# Patient Record
Sex: FEMALE | NOT HISPANIC OR LATINO | Employment: OTHER | URBAN - METROPOLITAN AREA
[De-identification: names, ages, dates, MRNs, and addresses within clinical notes are randomized per-mention and may not be internally consistent; named-entity substitution may affect disease eponyms.]

---

## 2021-01-18 ENCOUNTER — APPOINTMENT (OUTPATIENT)
Dept: PREADMISSION TESTING | Facility: HOSPITAL | Age: 86
End: 2021-01-18

## 2021-02-01 ENCOUNTER — APPOINTMENT (OUTPATIENT)
Dept: PREADMISSION TESTING | Facility: HOSPITAL | Age: 86
End: 2021-02-01

## 2023-08-24 ENCOUNTER — OFFICE VISIT (OUTPATIENT)
Dept: CARDIOLOGY | Facility: CLINIC | Age: 88
End: 2023-08-24
Payer: MEDICARE

## 2023-08-24 VITALS
HEART RATE: 69 BPM | OXYGEN SATURATION: 93 % | DIASTOLIC BLOOD PRESSURE: 60 MMHG | SYSTOLIC BLOOD PRESSURE: 140 MMHG | HEIGHT: 63 IN | WEIGHT: 147 LBS | BODY MASS INDEX: 26.05 KG/M2

## 2023-08-24 DIAGNOSIS — R06.02 SHORTNESS OF BREATH: ICD-10-CM

## 2023-08-24 DIAGNOSIS — I10 HYPERTENSION, UNSPECIFIED TYPE: ICD-10-CM

## 2023-08-24 DIAGNOSIS — R07.9 CHEST PAIN, UNSPECIFIED TYPE: Primary | ICD-10-CM

## 2023-08-24 PROCEDURE — 1160F RVW MEDS BY RX/DR IN RCRD: CPT | Performed by: NURSE PRACTITIONER

## 2023-08-24 PROCEDURE — 99204 OFFICE O/P NEW MOD 45 MIN: CPT | Performed by: NURSE PRACTITIONER

## 2023-08-24 PROCEDURE — 1159F MED LIST DOCD IN RCRD: CPT | Performed by: NURSE PRACTITIONER

## 2023-08-24 RX ORDER — MULTIVIT-MIN/IRON/FOLIC ACID/K 18-600-40
CAPSULE ORAL
COMMUNITY

## 2023-08-24 RX ORDER — GABAPENTIN 300 MG/1
1 CAPSULE ORAL EVERY 6 HOURS
COMMUNITY
Start: 2023-08-08

## 2023-08-24 RX ORDER — LOSARTAN POTASSIUM 50 MG/1
1 TABLET ORAL EVERY 12 HOURS SCHEDULED
COMMUNITY
Start: 2023-06-29

## 2023-08-24 RX ORDER — OXYBUTYNIN CHLORIDE 5 MG/1
TABLET ORAL
COMMUNITY
Start: 2023-06-10

## 2023-08-24 RX ORDER — FUROSEMIDE 20 MG/1
1 TABLET ORAL DAILY
COMMUNITY
Start: 2023-08-17

## 2023-08-24 RX ORDER — FAMOTIDINE 20 MG/1
20 TABLET, FILM COATED ORAL 2 TIMES DAILY
COMMUNITY

## 2023-08-24 RX ORDER — ASPIRIN 81 MG/1
81 TABLET ORAL DAILY
COMMUNITY

## 2023-08-24 RX ORDER — AMLODIPINE BESYLATE 5 MG/1
1 TABLET ORAL DAILY
COMMUNITY
Start: 2023-07-25

## 2023-08-24 RX ORDER — CARVEDILOL 6.25 MG/1
1 TABLET ORAL EVERY 12 HOURS SCHEDULED
COMMUNITY
Start: 2023-07-25

## 2023-08-24 NOTE — PROGRESS NOTES
[facility] Provider Note     Date:   2023   Name: Kera Max  :   1927  PCP: Justin Gardner MD    Chief Complaint   Patient presents with    [facility]    Hospital Follow Up Visit    Chest Pain       Subjective     History of Present Illness  Kera Max is a 89 y.o. female with PMH of hypertension and GERD who presents today for new patient evaluation for recent ER visit for chest pain.  Patient presented to [facility] ER on 2023 with complaints of intermittent chest pain.  She had an episode of sudden onset upper abdominal/chest pain and shortness of breath which made her feel like she was going to pass out.  Work-up in the ER was unremarkable, MI was ruled out with 2 stable EKGs and 2 high-sensitivity troponins.  Her BNP was normal and chest x-ray was negative for congestive heart failure.  She was treated for mild dehydration and given 500 mL of fluid and remained asymptomatic.  She was discharged home to follow-up with PCP and cardiology.  They instructed her to hold off on diuretics and use KYLE hose instead for lower extremity edema.  She feels more short of breath than usual but she has not had chest pain since the ER visit.  She denies palpitations, dizziness, lower extremity edema or syncope.  She lives in independent living at [facility] and uses a rolling walker to assist with ambulation.    No specialty comments available.     Reports Denies   Chest Pain [] [x]   Shortness of Air [x] []   Palpitations [] [x]   Edema [] [x]   Dizziness [] [x]   Syncope [] [x]       No Known Allergies    Current Outpatient Medications:     amLODIPine (NORVASC) 5 MG tablet, Take 1 tablet by mouth Daily., Disp: , Rfl:     carvedilol (COREG) 6.25 MG tablet, Take 1 tablet by mouth Every 12 (Twelve) Hours., Disp: , Rfl:     furosemide (LASIX) 20 MG tablet, Take 1 tablet by mouth Daily., Disp: , Rfl:     gabapentin (NEURONTIN) 300 MG capsule, Take 1  "capsule by mouth Every 6 (Six) Hours., Disp: , Rfl:     losartan (COZAAR) 50 MG tablet, Take 1 tablet by mouth Every 12 (Twelve) Hours., Disp: , Rfl:     oxybutynin (DITROPAN) 5 MG tablet, TAKE 1/2 (ONE-HALF) TABLET BY MOUTH ONCE DAILY AS NEEDED, Disp: , Rfl:     aspirin 81 MG EC tablet, Take 1 tablet by mouth Daily., Disp: , Rfl:     Cholecalciferol (Vitamin D) 50 MCG ( UT) capsule, Take  by mouth., Disp: , Rfl:     famotidine (PEPCID) 20 MG tablet, Take 1 tablet by mouth 2 (Two) Times a Day., Disp: , Rfl:     Mirabegron ER (Myrbetriq) 25 MG tablet sustained-release 24 hour 24 hr tablet, Take 1 tablet by mouth Daily., Disp: , Rfl:     Past Medical History:   Diagnosis Date    GERD (gastroesophageal reflux disease)     Hypertension       Past Surgical History:   Procedure Laterality Date    APPENDECTOMY       SECTION      CHOLECYSTECTOMY      COLONOSCOPY      ENDOSCOPY      FEMUR SURGERY      HERNIA REPAIR      KNEE ARTHROPLASTY      KNEE SURGERY      TONSILLECTOMY       Family History   Problem Relation Age of Onset    No Known Problems Mother     No Known Problems Father      Social History     Socioeconomic History    Marital status:    Tobacco Use    Smoking status: Never     Passive exposure: Never    Smokeless tobacco: Never   Vaping Use    Vaping Use: Never used   Substance and Sexual Activity    Alcohol use: Never    Drug use: Never    Sexual activity: Defer       Objective     Vital Signs:  /60   Pulse 69   Ht 160 cm (63\")   Wt 66.7 kg (147 lb)   SpO2 93%   BMI 26.04 kg/mý   Estimated body mass index is 26.04 kg/mý as calculated from the following:    Height as of this encounter: 160 cm (63\").    Weight as of this encounter: 66.7 kg (147 lb).             Physical Exam  Constitutional:       Appearance: Normal appearance. She is well-developed.   HENT:      Head: Normocephalic and atraumatic.   Eyes:      Pupils: Pupils are equal, round, and reactive to light.   Neck:      " Vascular: No carotid bruit.   Cardiovascular:      Rate and Rhythm: Normal rate and regular rhythm.      Pulses: Normal pulses.           Dorsalis pedis pulses are 2+ on the right side and 2+ on the left side.        Posterior tibial pulses are 2+ on the right side and 2+ on the left side.      Heart sounds: Normal heart sounds. No murmur heard.  Pulmonary:      Breath sounds: Normal breath sounds. No wheezing or rhonchi.   Musculoskeletal:      Right lower leg: No edema.      Left lower leg: No edema.   Skin:     Capillary Refill: Capillary refill takes less than 2 seconds.      Coloration: Skin is not cyanotic.      Nails: There is no clubbing.   Neurological:      Mental Status: She is alert and oriented to person, place, and time.      Motor: No weakness.      Gait: Gait normal.   Psychiatric:         Mood and Affect: Mood normal.         Behavior: Behavior is cooperative.         Thought Content: Thought content normal.         Cognition and Memory: Memory normal.         Recent hospitalization notes reviewed: ER notes and labs from 8/20/2023 reviewed          Assessment and Plan     Diagnoses and all orders for this visit:    1. Chest pain, unspecified type (Primary)  Assessment & Plan:  ER visit on 8/20/2023 for acute episode of chest pain.  MI ruled out, with 2 stable EKGs and 2 high sensitive troponins, BNP was normal.  She was treated for dehydration and instructed to follow-up with cardiology.  She has not had any chest pain since the ER visit.  We discussed further cardiac testing including an echocardiogram and stress test.  Patient declines a stress test at this time but is okay with an echocardiogram.      2. Hypertension, unspecified type  Assessment & Plan:  Hypertension is  stable .  Continue current treatment regimen.  Dietary sodium restriction.  Blood pressure will be reassessed at the next regular appointment.      3. Shortness of breath  Assessment & Plan:  - Echocardiogram for further  evaluation and management.      Orders:  -     Adult Transthoracic Echo Complete W/ Cont if Necessary Per Protocol; Future        Recommendations: ER if symptoms increase and Report if any new/changing symptoms immediately          Follow Up  Return in about 4 weeks (around 9/21/2023) for echo results. .  Patient was given instructions and counseling regarding her condition or for health maintenance advice. Please see specific information pulled into the AVS if appropriate.

## 2023-08-24 NOTE — ASSESSMENT & PLAN NOTE
ER visit on 8/20/2023 for acute episode of chest pain.  MI ruled out, with 2 stable EKGs and 2 high sensitive troponins, BNP was normal.  She was treated for dehydration and instructed to follow-up with cardiology.  She has not had any chest pain since the ER visit.  We discussed further cardiac testing including an echocardiogram and stress test.  Patient declines a stress test at this time but is okay with an echocardiogram.

## 2023-08-24 NOTE — ASSESSMENT & PLAN NOTE
Hypertension is  stable .  Continue current treatment regimen.  Dietary sodium restriction.  Blood pressure will be reassessed at the next regular appointment.

## 2023-08-28 PROCEDURE — 93306 TTE W/DOPPLER COMPLETE: CPT | Performed by: INTERNAL MEDICINE

## 2023-09-06 ENCOUNTER — OUTSIDE FACILITY SERVICE (OUTPATIENT)
Dept: CARDIOLOGY | Facility: CLINIC | Age: 88
End: 2023-09-06
Payer: MEDICARE

## 2023-09-25 ENCOUNTER — OFFICE VISIT (OUTPATIENT)
Dept: CARDIOLOGY | Facility: CLINIC | Age: 88
End: 2023-09-25

## 2023-09-25 VITALS
SYSTOLIC BLOOD PRESSURE: 138 MMHG | WEIGHT: 149 LBS | DIASTOLIC BLOOD PRESSURE: 90 MMHG | OXYGEN SATURATION: 97 % | BODY MASS INDEX: 26.4 KG/M2 | HEIGHT: 63 IN | HEART RATE: 81 BPM

## 2023-09-25 DIAGNOSIS — I51.89 DIASTOLIC DYSFUNCTION: ICD-10-CM

## 2023-09-25 DIAGNOSIS — I10 HYPERTENSION, UNSPECIFIED TYPE: Primary | ICD-10-CM

## 2023-09-25 DIAGNOSIS — I05.0 MITRAL VALVE STENOSIS, UNSPECIFIED ETIOLOGY: ICD-10-CM

## 2023-09-25 DIAGNOSIS — R07.9 CHEST PAIN, UNSPECIFIED TYPE: ICD-10-CM

## 2023-09-25 PROBLEM — R41.3 AMNESIA: Status: ACTIVE | Noted: 2023-09-25

## 2023-09-25 PROCEDURE — 99214 OFFICE O/P EST MOD 30 MIN: CPT | Performed by: NURSE PRACTITIONER

## 2023-09-25 PROCEDURE — 1159F MED LIST DOCD IN RCRD: CPT | Performed by: NURSE PRACTITIONER

## 2023-09-25 PROCEDURE — 1160F RVW MEDS BY RX/DR IN RCRD: CPT | Performed by: NURSE PRACTITIONER

## 2023-09-25 NOTE — ASSESSMENT & PLAN NOTE
Mild mitral stenosis noted on echo from 8/28/2023.  - We will continue to monitor with annual echocardiogram

## 2023-09-25 NOTE — ASSESSMENT & PLAN NOTE
ER visit on 8/20/2023 for acute episode of chest pain. MI ruled out, with 2 stable EKGs and 2 high sensitive troponins, BNP was normal. She was treated for dehydration and instructed to follow-up with cardiology. She has not had any chest pain since the ER visit.  She occasionally feels a sensation in her chest that she cannot describe, but does not describe it as a pain or palpitations.  We discussed further cardiac testing (stress test) at last office visit but she declines a stress test at this time.

## 2023-09-25 NOTE — PROGRESS NOTES
[facility] Provider Note     Date:   2023   Name: Kera Max  :   1927  PCP: Justin Gardner MD    Chief Complaint   Patient presents with    Chest Pain     Echo results       Subjective     History of Present Illness  Kera Max is a 89 y.o. female with past medical history of hypertension and GERD who presents today for follow-up on recent echocardiogram.  Patient initially presented on 2023 for an ER follow-up visit.  She was evaluated at [facility] ER on 2023 with complaints of intermittent chest pain. She had an episode of sudden onset upper abdominal/chest pain and shortness of breath which made her feel like she was going to pass out. Work-up in the ER was unremarkable, MI was ruled out with 2 stable EKGs and 2 high-sensitivity troponins. Her BNP was normal and chest x-ray was negative for congestive heart failure. She was treated for mild dehydration and given 500 mL of fluid and remained asymptomatic.  An echocardiogram was ordered and completed on 2023 that revealed normal LV size and function, mild mitral stenosis, mild to moderate mitral regurgitation and grade 1 diastolic dysfunction without elevated left atrial pressure.  She has not had any further episodes of chest pain or worsening shortness of breath.  She has an occasional sensation in her chest that she is unable to describe.  She does not feel like they are palpitations.  She denies dizziness or syncope.  She lives in independent living at [facility] and uses a rolling walker to assist with ambulation.    Cardiac history  1.  Diastolic dysfunction (grade 1).  2.  Hypertension    Echocardiogram 2023-normal LV size and function.  Mild mitral stenosis.  Mild to moderate mitral valve regurgitation.  Mildly elevated estimated RVSP.  Grade 1 diastolic dysfunction without elevated left atrial pressure.      No Known Allergies    Current Outpatient Medications:  "    amLODIPine (NORVASC) 5 MG tablet, Take 1 tablet by mouth Daily., Disp: , Rfl:     aspirin 81 MG EC tablet, Take 1 tablet by mouth Daily., Disp: , Rfl:     carvedilol (COREG) 6.25 MG tablet, Take 1 tablet by mouth Every 12 (Twelve) Hours., Disp: , Rfl:     Cholecalciferol (Vitamin D) 50 MCG (2000 UT) capsule, Take  by mouth., Disp: , Rfl:     famotidine (PEPCID) 20 MG tablet, Take 1 tablet by mouth 2 (Two) Times a Day., Disp: , Rfl:     furosemide (LASIX) 20 MG tablet, Take 1 tablet by mouth Daily., Disp: , Rfl:     gabapentin (NEURONTIN) 300 MG capsule, Take 1 capsule by mouth Every 6 (Six) Hours., Disp: , Rfl:     losartan (COZAAR) 50 MG tablet, Take 1 tablet by mouth Every 12 (Twelve) Hours., Disp: , Rfl:     Mirabegron ER (MYRBETRIQ) 25 MG tablet sustained-release 24 hour 24 hr tablet, Take 1 tablet by mouth Daily., Disp: , Rfl:     oxybutynin (DITROPAN) 5 MG tablet, TAKE 1/2 (ONE-HALF) TABLET BY MOUTH ONCE DAILY AS NEEDED, Disp: , Rfl:     Past Medical History:   Diagnosis Date    GERD (gastroesophageal reflux disease)     Hypertension       Past Surgical History:   Procedure Laterality Date    APPENDECTOMY       SECTION      CHOLECYSTECTOMY      COLONOSCOPY      ENDOSCOPY      FEMUR SURGERY      HERNIA REPAIR      KNEE ARTHROPLASTY      KNEE SURGERY      TONSILLECTOMY       Family History   Problem Relation Age of Onset    No Known Problems Mother     No Known Problems Father      Social History     Socioeconomic History    Marital status:    Tobacco Use    Smoking status: Never     Passive exposure: Never    Smokeless tobacco: Never   Vaping Use    Vaping Use: Never used   Substance and Sexual Activity    Alcohol use: Never    Drug use: Never    Sexual activity: Defer       Objective     Vital Signs:  /90   Pulse 81   Ht 160 cm (63\")   Wt 67.6 kg (149 lb)   SpO2 97%   BMI 26.39 kg/m²   Estimated body mass index is 26.39 kg/m² as calculated from the following:    Height as of this " "encounter: 160 cm (63\").    Weight as of this encounter: 67.6 kg (149 lb).             Physical Exam  Vitals reviewed.   Constitutional:       Appearance: Normal appearance.   HENT:      Head: Normocephalic.   Cardiovascular:      Rate and Rhythm: Normal rate and regular rhythm.      Heart sounds: Normal heart sounds.   Pulmonary:      Effort: Pulmonary effort is normal.      Breath sounds: Normal breath sounds.   Musculoskeletal:      Right lower leg: Edema (Trace) present.      Left lower leg: Edema (Trace) present.   Skin:     General: Skin is warm and dry.      Capillary Refill: Capillary refill takes less than 2 seconds.   Neurological:      General: No focal deficit present.      Mental Status: She is alert and oriented to person, place, and time.   Psychiatric:         Mood and Affect: Mood normal.         Behavior: Behavior normal.         Cardiology studies reviewed: Echocardiogram reviewed.          Assessment and Plan     Diagnoses and all orders for this visit:    1. Hypertension, unspecified type (Primary)  Assessment & Plan:  Hypertension is  stable .  Continue current treatment regimen.  Dietary sodium restriction.  Blood pressure will be reassessed at the next regular appointment.      2. Chest pain, unspecified type  Assessment & Plan:  ER visit on 8/20/2023 for acute episode of chest pain. MI ruled out, with 2 stable EKGs and 2 high sensitive troponins, BNP was normal. She was treated for dehydration and instructed to follow-up with cardiology. She has not had any chest pain since the ER visit.  She occasionally feels a sensation in her chest that she cannot describe, but does not describe it as a pain or palpitations.  We discussed further cardiac testing (stress test) at last office visit but she declines a stress test at this time.        3. Diastolic dysfunction  Assessment & Plan:  Echocardiogram from 8/28/2023 showed grade 1 diastolic dysfunction without elevated left atrial pressure.  - We " will continue to monitor.      4. Mitral valve stenosis, unspecified etiology  Assessment & Plan:  Mild mitral stenosis noted on echo from 8/28/2023.  - We will continue to monitor with annual echocardiogram          Recommendations: Report if any new/changing symptoms immediately, Limit salt, and Elevate legs          Follow Up  Return in 6 months (on 3/25/2024) for Mitral stenosis/diastolic dysfunction.  Patient was given instructions and counseling regarding her condition or for health maintenance advice. Please see specific information pulled into the AVS if appropriate.

## 2023-09-25 NOTE — ASSESSMENT & PLAN NOTE
Echocardiogram from 8/28/2023 showed grade 1 diastolic dysfunction without elevated left atrial pressure.  - We will continue to monitor.

## 2024-01-02 ENCOUNTER — OFFICE VISIT (OUTPATIENT)
Dept: CARDIOLOGY | Facility: CLINIC | Age: 89
End: 2024-01-02
Payer: MEDICARE

## 2024-01-02 VITALS
HEIGHT: 63 IN | BODY MASS INDEX: 26.4 KG/M2 | SYSTOLIC BLOOD PRESSURE: 124 MMHG | DIASTOLIC BLOOD PRESSURE: 66 MMHG | HEART RATE: 68 BPM | WEIGHT: 149 LBS | OXYGEN SATURATION: 96 %

## 2024-01-02 DIAGNOSIS — I10 HYPERTENSION, UNSPECIFIED TYPE: ICD-10-CM

## 2024-01-02 DIAGNOSIS — I51.89 DIASTOLIC DYSFUNCTION: Primary | ICD-10-CM

## 2024-01-02 DIAGNOSIS — I05.0 MITRAL VALVE STENOSIS, UNSPECIFIED ETIOLOGY: ICD-10-CM

## 2024-01-02 NOTE — PROGRESS NOTES
[facility] Provider Note     Date:   2024   Name: Kera Max  :   1927  PCP: Justin Gardner MD    Chief Complaint   Patient presents with    Hypertension       Subjective     History of Present Illness  Kera Max is a 89 y.o. female with past medical history of hypertension and GERD who presents today for follow-up visit.  Patient initially presented on 2023 for an ER follow-up visit.  She was evaluated at [facility] ER on 2023 with complaints of intermittent chest pain. She had an episode of sudden onset upper abdominal/chest pain and shortness of breath which made her feel like she was going to pass out. Work-up in the ER was unremarkable, MI was ruled out with 2 stable EKGs and 2 high-sensitivity troponins. Her BNP was normal and chest x-ray was negative for congestive heart failure. She was treated for mild dehydration and given 500 mL of fluid and remained asymptomatic.  An echocardiogram was ordered and completed on 2023 that revealed normal LV size and function, mild mitral stenosis, mild to moderate mitral regurgitation and grade 1 diastolic dysfunction without elevated left atrial pressure.  She has not had any further episodes of chest pain or worsening shortness of breath.  She has an occasional sensation in her chest that she is unable to describe.  She does not feel like they are palpitations.  She denies dizziness or syncope.  She lives in independent living at [facility] and uses a rolling walker to assist with ambulation.     Cardiac history  1.  Diastolic dysfunction (grade 1).  2.  Hypertension    Echocardiogram 2023-normal LV size and function.  Mild mitral stenosis.  Mild to moderate mitral valve regurgitation.  Mildly elevated estimated RVSP.  Grade 1 diastolic dysfunction without elevated left atrial pressure.      No Known Allergies    Current Outpatient Medications:     amLODIPine (NORVASC) 5 MG  "tablet, Take 1 tablet by mouth Daily., Disp: , Rfl:     aspirin 81 MG EC tablet, Take 1 tablet by mouth Daily., Disp: , Rfl:     carvedilol (COREG) 6.25 MG tablet, Take 1 tablet by mouth Every 12 (Twelve) Hours., Disp: , Rfl:     Cholecalciferol (Vitamin D) 50 MCG (2000 UT) capsule, Take  by mouth., Disp: , Rfl:     famotidine (PEPCID) 20 MG tablet, Take 1 tablet by mouth 2 (Two) Times a Day., Disp: , Rfl:     furosemide (LASIX) 20 MG tablet, Take 1 tablet by mouth Daily., Disp: , Rfl:     gabapentin (NEURONTIN) 300 MG capsule, Take 1 capsule by mouth Every 6 (Six) Hours., Disp: , Rfl:     losartan (COZAAR) 50 MG tablet, Take 1 tablet by mouth Every 12 (Twelve) Hours., Disp: , Rfl:     Mirabegron ER (MYRBETRIQ) 25 MG tablet sustained-release 24 hour 24 hr tablet, Take 1 tablet by mouth Daily., Disp: , Rfl:     oxybutynin (DITROPAN) 5 MG tablet, TAKE 1/2 (ONE-HALF) TABLET BY MOUTH ONCE DAILY AS NEEDED, Disp: , Rfl:     Past Medical History:   Diagnosis Date    GERD (gastroesophageal reflux disease)     Hypertension       Past Surgical History:   Procedure Laterality Date    APPENDECTOMY       SECTION      CHOLECYSTECTOMY      COLONOSCOPY      ENDOSCOPY      FEMUR SURGERY      HERNIA REPAIR      KNEE ARTHROPLASTY      KNEE SURGERY      TONSILLECTOMY       Family History   Problem Relation Age of Onset    No Known Problems Mother     No Known Problems Father      Social History     Socioeconomic History    Marital status:    Tobacco Use    Smoking status: Never     Passive exposure: Never    Smokeless tobacco: Never   Vaping Use    Vaping Use: Never used   Substance and Sexual Activity    Alcohol use: Never    Drug use: Never    Sexual activity: Defer       Objective     Vital Signs:  /66   Pulse 68   Ht 160 cm (63\")   Wt 67.6 kg (149 lb)   SpO2 96%   BMI 26.39 kg/m²   Estimated body mass index is 26.39 kg/m² as calculated from the following:    Height as of this encounter: 160 cm (63\").    " Weight as of this encounter: 67.6 kg (149 lb).             Physical Exam  Vitals reviewed.   Constitutional:       Appearance: Normal appearance.   HENT:      Head: Normocephalic.   Cardiovascular:      Rate and Rhythm: Normal rate and regular rhythm.      Heart sounds: Normal heart sounds.   Pulmonary:      Effort: Pulmonary effort is normal.      Breath sounds: Normal breath sounds.   Musculoskeletal:      Right lower leg: No edema.      Left lower leg: No edema.   Skin:     General: Skin is warm and dry.      Capillary Refill: Capillary refill takes less than 2 seconds.   Neurological:      General: No focal deficit present.      Mental Status: She is alert and oriented to person, place, and time.   Psychiatric:         Mood and Affect: Mood normal.         Behavior: Behavior normal.                     Assessment and Plan     Diagnoses and all orders for this visit:    1. Diastolic dysfunction (Primary)  Assessment & Plan:  Echocardiogram from 8/28/2023 showed grade 1 diastolic dysfunction without elevated left atrial pressure.  - We will continue to monitor.      2. Mitral valve stenosis, unspecified etiology  Assessment & Plan:  Mild mitral stenosis and mild-moderate mitral regurgitation noted on echo from 8/28/2023.      3. Hypertension, unspecified type  Assessment & Plan:  Hypertension is  stable .  Continue current treatment regimen.  Dietary sodium restriction.  Blood pressure will be reassessed at the next regular appointment          Recommendations: Report if any new/changing symptoms immediately          Follow Up  No follow-ups on file.  Patient was given instructions and counseling regarding her condition or for health maintenance advice. Please see specific information pulled into the AVS if appropriate.

## 2024-01-02 NOTE — ASSESSMENT & PLAN NOTE
Hypertension is  stable .  Continue current treatment regimen.  Dietary sodium restriction.  Blood pressure will be reassessed at the next regular appointment

## 2024-01-23 ENCOUNTER — OFFICE VISIT (OUTPATIENT)
Dept: CARDIOLOGY | Facility: CLINIC | Age: 89
End: 2024-01-23
Payer: MEDICARE

## 2024-01-23 VITALS
HEART RATE: 78 BPM | SYSTOLIC BLOOD PRESSURE: 150 MMHG | HEIGHT: 63 IN | WEIGHT: 146.9 LBS | BODY MASS INDEX: 26.03 KG/M2 | DIASTOLIC BLOOD PRESSURE: 78 MMHG | OXYGEN SATURATION: 97 %

## 2024-01-23 DIAGNOSIS — I51.89 DIASTOLIC DYSFUNCTION: Primary | ICD-10-CM

## 2024-01-23 PROCEDURE — 99213 OFFICE O/P EST LOW 20 MIN: CPT | Performed by: INTERNAL MEDICINE

## 2024-01-23 NOTE — PROGRESS NOTES
[facility] Provider Note     Date:   2024   Name: Kera Max  :   1927  PCP: Justin Gardner MD    Chief Complaint   Patient presents with    Follow-up     BP follow up        Subjective     History of Present Illness  Kera Max is a 89 y.o. female who presents today for second opinion.  She was not happy with seeing our nurse practitioner at prior visits.  She has met me before at the nursing home.  She is concerned about her heart function.  Her echocardiogram showed normal ejection fraction but she does have diastolic dysfunction.  She is not having any shortness of breath or lower extremity edema.  She overall seems stable with this and amlodipine may also help some pulmonary hypertension related to diastolic dysfunction.  I told her the treatment for this is mostly symptomatic related.  Also told her that this is usually not a life-threatening type of heart failure.  We could follow every 6 months to a year.  When going to schedule upfront she told the office staff she wanted to talk with her primary care doctor before scheduling again.    Cardiac history  1.  Diastolic dysfunction (grade 1).  2.  Hypertension    Echocardiogram 2023-normal LV size and function.  Mild mitral stenosis.  Mild to moderate mitral valve regurgitation.  Mildly elevated estimated RVSP.  Grade 1 diastolic dysfunction without elevated left atrial pressure.    No Known Allergies    Current Outpatient Medications:     amLODIPine (NORVASC) 5 MG tablet, Take 1 tablet by mouth Daily., Disp: , Rfl:     aspirin 81 MG EC tablet, Take 1 tablet by mouth Daily., Disp: , Rfl:     carvedilol (COREG) 6.25 MG tablet, Take 1 tablet by mouth Every 12 (Twelve) Hours., Disp: , Rfl:     Cholecalciferol (Vitamin D) 50 MCG (2000) capsule, Take  by mouth., Disp: , Rfl:     famotidine (PEPCID) 20 MG tablet, Take 1 tablet by mouth 2 (Two) Times a Day., Disp: , Rfl:     furosemide (LASIX) 20 MG  "tablet, Take 1 tablet by mouth Daily., Disp: , Rfl:     gabapentin (NEURONTIN) 300 MG capsule, Take 1 capsule by mouth Every 6 (Six) Hours., Disp: , Rfl:     losartan (COZAAR) 50 MG tablet, Take 1 tablet by mouth Every 12 (Twelve) Hours., Disp: , Rfl:     Mirabegron ER (MYRBETRIQ) 25 MG tablet sustained-release 24 hour 24 hr tablet, Take 1 tablet by mouth Daily., Disp: , Rfl:     oxybutynin (DITROPAN) 5 MG tablet, TAKE 1/2 (ONE-HALF) TABLET BY MOUTH ONCE DAILY AS NEEDED, Disp: , Rfl:     Past Medical History:   Diagnosis Date    GERD (gastroesophageal reflux disease)     Hypertension       Past Surgical History:   Procedure Laterality Date    APPENDECTOMY       SECTION      CHOLECYSTECTOMY      COLONOSCOPY      ENDOSCOPY      FEMUR SURGERY      HERNIA REPAIR      KNEE ARTHROPLASTY      KNEE SURGERY      TONSILLECTOMY       Family History   Problem Relation Age of Onset    No Known Problems Mother     No Known Problems Father      Social History     Socioeconomic History    Marital status:    Tobacco Use    Smoking status: Never     Passive exposure: Never    Smokeless tobacco: Never   Vaping Use    Vaping Use: Never used   Substance and Sexual Activity    Alcohol use: Never    Drug use: Never    Sexual activity: Defer       Objective     Vital Signs:  /78 (BP Location: Left arm, Patient Position: Sitting, Cuff Size: Adult)   Pulse 78   Ht 160 cm (63\")   Wt 66.6 kg (146 lb 14.4 oz)   SpO2 97%   BMI 26.02 kg/m²   Estimated body mass index is 26.02 kg/m² as calculated from the following:    Height as of this encounter: 160 cm (63\").    Weight as of this encounter: 66.6 kg (146 lb 14.4 oz).         Physical Exam  Vitals reviewed.   Constitutional:       General: She is not in acute distress.     Appearance: Normal appearance.   HENT:      Head: Normocephalic and atraumatic.      Mouth/Throat:      Mouth: Mucous membranes are moist.   Eyes:      Conjunctiva/sclera: Conjunctivae normal.   Neck: "      Vascular: No carotid bruit.   Cardiovascular:      Rate and Rhythm: Normal rate and regular rhythm.      Pulses: Normal pulses.      Heart sounds: Normal heart sounds. No murmur heard.  Pulmonary:      Effort: Pulmonary effort is normal. No respiratory distress.      Breath sounds: Normal breath sounds. No wheezing or rhonchi.   Abdominal:      General: Abdomen is flat.      Palpations: Abdomen is soft.   Musculoskeletal:      Cervical back: Normal range of motion and neck supple.      Right lower leg: No edema.      Left lower leg: No edema.   Skin:     General: Skin is warm and dry.      Coloration: Skin is not jaundiced.   Neurological:      General: No focal deficit present.      Mental Status: She is alert and oriented to person, place, and time. Mental status is at baseline.      GCS: GCS eye subscore is 4. GCS verbal subscore is 5. GCS motor subscore is 6.      Cranial Nerves: No cranial nerve deficit.      Motor: No weakness.      Gait: Gait abnormal (Walker).   Psychiatric:         Mood and Affect: Mood and affect normal. Mood is not anxious.         Speech: Speech normal.         Behavior: Behavior normal.                     Assessment and Plan     Diagnoses and all orders for this visit:    1. Diastolic dysfunction (Primary)  Comments:  Euvolemic.  Reassurance provided.  Would recommend continuing diuretics and follow-up 6 months.              Follow Up  No follow-ups on file.    Kalyn Slaughter MD   01/23/2024     Please note that this explicitly excludes time spent on other separate billable services such as performing procedures or test interpretation, when applicable.    This note was created using dictation software which occasionally transcribes nonsensical phrases. Please contact the provider if any clarification is needed.